# Patient Record
Sex: FEMALE | Race: WHITE | NOT HISPANIC OR LATINO | Employment: UNEMPLOYED | URBAN - METROPOLITAN AREA
[De-identification: names, ages, dates, MRNs, and addresses within clinical notes are randomized per-mention and may not be internally consistent; named-entity substitution may affect disease eponyms.]

---

## 2017-12-26 ENCOUNTER — SPECIALTY PHARMACY (OUTPATIENT)
Dept: ONCOLOGY | Facility: HOSPITAL | Age: 57
End: 2017-12-26

## 2017-12-26 ENCOUNTER — LAB (OUTPATIENT)
Dept: LAB | Facility: HOSPITAL | Age: 57
End: 2017-12-26

## 2017-12-26 ENCOUNTER — CONSULT (OUTPATIENT)
Dept: ONCOLOGY | Facility: CLINIC | Age: 57
End: 2017-12-26

## 2017-12-26 VITALS
TEMPERATURE: 97.6 F | HEART RATE: 112 BPM | SYSTOLIC BLOOD PRESSURE: 133 MMHG | RESPIRATION RATE: 16 BRPM | WEIGHT: 123 LBS | DIASTOLIC BLOOD PRESSURE: 87 MMHG | BODY MASS INDEX: 22.63 KG/M2 | HEIGHT: 62 IN

## 2017-12-26 DIAGNOSIS — C50.919 METASTATIC BREAST CANCER: Primary | ICD-10-CM

## 2017-12-26 DIAGNOSIS — C50.919 METASTATIC BREAST CANCER: ICD-10-CM

## 2017-12-26 LAB
ALBUMIN SERPL-MCNC: 4.1 G/DL (ref 3.2–4.8)
ALBUMIN/GLOB SERPL: 1.5 G/DL (ref 1.5–2.5)
ALP SERPL-CCNC: 193 U/L (ref 25–100)
ALT SERPL W P-5'-P-CCNC: 15 U/L (ref 7–40)
ANION GAP SERPL CALCULATED.3IONS-SCNC: 10 MMOL/L (ref 3–11)
AST SERPL-CCNC: 26 U/L (ref 0–33)
BILIRUB SERPL-MCNC: 0.4 MG/DL (ref 0.3–1.2)
BUN BLD-MCNC: 11 MG/DL (ref 9–23)
BUN/CREAT SERPL: 18.3 (ref 7–25)
CALCIUM SPEC-SCNC: 9.5 MG/DL (ref 8.7–10.4)
CHLORIDE SERPL-SCNC: 101 MMOL/L (ref 99–109)
CO2 SERPL-SCNC: 28 MMOL/L (ref 20–31)
CREAT BLD-MCNC: 0.6 MG/DL (ref 0.6–1.3)
ERYTHROCYTE [DISTWIDTH] IN BLOOD BY AUTOMATED COUNT: 14.8 % (ref 11.3–14.5)
GFR SERPL CREATININE-BSD FRML MDRD: 103 ML/MIN/1.73
GLOBULIN UR ELPH-MCNC: 2.8 GM/DL
GLUCOSE BLD-MCNC: 107 MG/DL (ref 70–100)
HCT VFR BLD AUTO: 33.5 % (ref 34.5–44)
HGB BLD-MCNC: 10.5 G/DL (ref 11.5–15.5)
LYMPHOCYTES # BLD AUTO: 1.3 10*3/MM3 (ref 0.6–4.8)
LYMPHOCYTES NFR BLD AUTO: 14 % (ref 24–44)
MCH RBC QN AUTO: 30.1 PG (ref 27–31)
MCHC RBC AUTO-ENTMCNC: 31.2 G/DL (ref 32–36)
MCV RBC AUTO: 96.4 FL (ref 80–99)
MONOCYTES # BLD AUTO: 0.4 10*3/MM3 (ref 0–1)
MONOCYTES NFR BLD AUTO: 4.5 % (ref 0–12)
NEUTROPHILS # BLD AUTO: 7.3 10*3/MM3 (ref 1.5–8.3)
NEUTROPHILS NFR BLD AUTO: 81.5 % (ref 41–71)
PLATELET # BLD AUTO: 599 10*3/MM3 (ref 150–450)
PMV BLD AUTO: 6.7 FL (ref 6–12)
POTASSIUM BLD-SCNC: 4.7 MMOL/L (ref 3.5–5.5)
PROT SERPL-MCNC: 6.9 G/DL (ref 5.7–8.2)
RBC # BLD AUTO: 3.47 10*6/MM3 (ref 3.89–5.14)
SODIUM BLD-SCNC: 139 MMOL/L (ref 132–146)
WBC NRBC COR # BLD: 9 10*3/MM3 (ref 3.5–10.8)

## 2017-12-26 PROCEDURE — 80053 COMPREHEN METABOLIC PANEL: CPT

## 2017-12-26 PROCEDURE — 86300 IMMUNOASSAY TUMOR CA 15-3: CPT | Performed by: INTERNAL MEDICINE

## 2017-12-26 PROCEDURE — 99205 OFFICE O/P NEW HI 60 MIN: CPT | Performed by: INTERNAL MEDICINE

## 2017-12-26 PROCEDURE — 85025 COMPLETE CBC W/AUTO DIFF WBC: CPT

## 2017-12-26 PROCEDURE — 36415 COLL VENOUS BLD VENIPUNCTURE: CPT | Performed by: INTERNAL MEDICINE

## 2017-12-26 RX ORDER — CYCLOBENZAPRINE HCL 10 MG
2 TABLET ORAL
COMMUNITY
Start: 2017-12-02

## 2017-12-26 RX ORDER — MORPHINE SULFATE 15 MG/1
1 TABLET, FILM COATED, EXTENDED RELEASE ORAL DAILY PRN
COMMUNITY
Start: 2017-12-16

## 2017-12-26 RX ORDER — ESCITALOPRAM OXALATE 5 MG/1
5 TABLET ORAL DAILY
Qty: 90 TABLET | Refills: 3 | Status: SHIPPED | OUTPATIENT
Start: 2017-12-26

## 2017-12-26 RX ORDER — OXYCODONE HYDROCHLORIDE 5 MG/1
1 TABLET ORAL 2 TIMES DAILY PRN
COMMUNITY
Start: 2017-12-16

## 2017-12-26 RX ORDER — METOPROLOL SUCCINATE 50 MG/1
1 TABLET, EXTENDED RELEASE ORAL DAILY
COMMUNITY
Start: 2017-11-21

## 2017-12-26 RX ORDER — ONDANSETRON HYDROCHLORIDE 8 MG/1
8 TABLET, FILM COATED ORAL 3 TIMES DAILY PRN
Qty: 30 TABLET | Refills: 5 | Status: SHIPPED | OUTPATIENT
Start: 2017-12-26

## 2017-12-26 RX ORDER — MELOXICAM 7.5 MG/1
1 TABLET ORAL DAILY
COMMUNITY
Start: 2017-12-17

## 2017-12-26 RX ORDER — OMEPRAZOLE 20 MG/1
1 CAPSULE, DELAYED RELEASE ORAL DAILY
COMMUNITY
Start: 2017-12-17

## 2017-12-26 NOTE — PROGRESS NOTES
CHIEF COMPLAINT:   Evaluation of patient with recently diagnosed widely metastatic (bone only) triple positive breast cancer    Problem List:  Patient Active Problem List   Diagnosis   • Metastatic breast cancer         RECORDS OBTAINED:  Records of the patients history including those obtained from Dr. Jasmyne muro. provider found were reviewed and summarized in detail.    HISTORY OF PRESENT ILLNESS:    This very pleasant 57-year-old   lady from [facility] is here due to her metastatic breast cancer.  He presented in 2014 with a 3.7 cm greatest dimension right breast cancer, ER and HI positive, HER-2 positive.  She was treated juan-adjuvantly with perjeta-based therapy.  Unfortunately she became symptomatic from a decrease in her ejection fraction.  Herceptin was discontinued.  She was treated with 2 further doses of cytotoxic therapy for a total of 4 doses of therapy per her recollection.  She doesn't ever think that she had much of a response to any of that treatment.  She ultimately underwent right mastectomy almost a year later in 2015.  That revealed ypT2 N1 A disease with 2 of 9 lymph nodes involved.  Surgical margins were negative.  She did not receive any postoperative radiotherapy.  She was placed on Arimidex.  She has remained on that until recently.    Her relevant history includes the fact that she has suffered from fibromyalgia for years.  In approximate the spring of this year she be first began to develop symptoms in her right hip pain.  This worsened over the next months.  Ultimately, imaging studies were obtained which showed widely metastatic bone disease in multiple sites including severe involvement of the right acetabulum.  Imaging including CT scans in November, approximately a month and a half ago, did not reveal any visceral or lung disease.  She has had some headaches.  She has not had imaging of her brain.    She was referred to orthopedics and approximately 3  weeks ago she underwent a reconstruction of her right acetabulum.  She is recovering and has recovered uneventfully.  She is weightbearing.  She is using walker at home.  Her Arimidex has been discontinued.  She has seen radiation medicine at [facility] for possible postoperative radiotherapy.  Her weight and appetite are been relatively stable.  She has been very nervous regarding her diagnosis.  She has also been depressed of course.  Her  has been very supportive.  The only other site of pain that she really does notices is something new is her left lower posterolateral rib area this has been present for a month or more and has not been progressive as far as current pain control, the patient takes 15 mg of extended release morphine once every morning and she takes one oxycodone 5 mg twice daily otherwise    Past Medical History:   Diagnosis Date   • Anemia    • Arthritis    • Breast cancer 10/2014    H/O chemotherapy   • Depression    • Fracture     Right Hip   • GERD (gastroesophageal reflux disease)    • Heart failure    • Heart murmur    • Hypertension    • IBS (irritable bowel syndrome)    • Kidney infection    Status post ablation for supraventricular tachycardia in 2000  Long-standing Sjrogens  syndrome  Long-standing fibromyalg    Her medications are listed on her intake sheet include cyclobenzaprine at night metoprolol ER omeprazole meloxicam and the above-mentioned oxycodone and morphine long-acting.  She was on lisinopril in the past for blood pressure control as well as a diuretic but has not been on these recently.    Drug allergies include Biaxin    Allergies   Allergen Reactions   • Biaxin [Clarithromycin]        Past Surgical History:   Procedure Laterality Date   • CARDIAC ABLATION  01/2000   • COLONOSCOPY  2017   • FOOT SURGERY Bilateral    • MASTECTOMY Right 09/2015    with lymphectomy       OB History   No data available       Social History     Social History   • Marital status:       "Spouse name: N/A   • Number of children: N/A   • Years of education: N/A     Social History Main Topics   • Smoking status: Never Smoker   • Smokeless tobacco: Never Used   • Alcohol use No   • Drug use: No   • Sexual activity: Defer     Other Topics Concern   • None     Social History Narrative   • None       Family History   Problem Relation Age of Onset   • Arthritis Mother    • Heart disease Father    • Pancreatic cancer Father    • Diabetes Father    • Breast cancer Sister    • Breast cancer Sister    • Heart disease Sister        REVIEW OF SYSTEMS:  Review of Systems   Constitutional: Negative for activity change and appetite change.        Fatigues easily   HENT: Negative for mouth sores, sinus pressure and voice change.         Has had recent headaches   Eyes: Negative for visual disturbance.   Respiratory: Negative for shortness of breath.    Cardiovascular: Negative for chest pain.        Issues of drug-related heart failure and supraventricular tachycardia mentioned above   Gastrointestinal: Negative for abdominal pain and vomiting.   Genitourinary: Negative for dysuria.   Musculoskeletal: Negative for arthralgias and myalgias.        Has chronic fibromyalgia.   Skin: Negative for color change.        Chronic Sjrogens  syndrome mentioned above   Neurological: Negative for dizziness, syncope and headaches.   Hematological: Negative for adenopathy.   Psychiatric/Behavioral: Negative for confusion, sleep disturbance and suicidal ideas. The patient is not nervous/anxious.        .  /87 Comment: LUE  Pulse 112  Temp 97.6 °F (36.4 °C) (Temporal Artery )   Resp 16  Ht 157.5 cm (62\")  Wt 55.8 kg (123 lb)  BMI 22.5 kg/m2    Physical  Exam:  Physical Exam   Constitutional: She is oriented to person, place, and time. She appears well-developed and well-nourished. No distress.   Well-developed well-nourished lady in no acute distress.  She appears mildly to moderately anxious at times, appropriately so "   HENT:   Head: Normocephalic.   Mouth/Throat: Oropharynx is clear and moist.   Eyes: Conjunctivae and EOM are normal. No scleral icterus.   Neck: Normal range of motion. Neck supple. No thyromegaly present.   Cardiovascular: Normal rate, regular rhythm and normal heart sounds.    No murmur heard.  Pulmonary/Chest: Effort normal and breath sounds normal. She has no wheezes. She has no rales.   Right mastectomy site noted.  No evidence of local recurrence.  Left breast without worrisome mass   Abdominal: Soft. Bowel sounds are normal. She exhibits no distension and no mass. There is no tenderness.   Musculoskeletal: She exhibits no edema or tenderness.   Lymphadenopathy:     She has no cervical adenopathy.   Neurological: She is alert and oriented to person, place, and time. She displays normal reflexes. No cranial nerve deficit.   Skin: Skin is warm and dry. No rash noted.   Psychiatric: She has a normal mood and affect. Judgment normal.   Vitals reviewed.        Performance Status: 1    Assessment/Plan     Recurrent metastatic breast cancer.  Ordinarily I would recommend Herceptin based therapy or Kadcyla or something similar.  With her history of Herceptin-related heart failure, that's not a good option.  Other options include Ibrance given with either Faslodex or letrozole.  Another option would be to begin cytotoxic therapy and I favor Xeloda.  I would also recommend that she be treated with Xgeva.    Pain Control-Currently Adequate.  I Will Get a Copy of Her Bone Scan to See If an Area Correlates with the Left Posterolateral Chest Discomfort That She Has.    Possible Need for Radiotherapy Following Right Hip Surgery.  I Don't Know If This Is Actually Needed since She Will Be Treated Systemically.  I Will Give This Further Thought.    Reactive Depression and Some Anxiety.  I Have Recommended and Written for Some Lexapro 10 Mg Once Daily    History of Herceptin-related cardiomyopathy.  I will talk with Dr. Wakefield  jolanta her cardiologist at the [facility].    All the above discussed.  I spent well more than an hour with the patient and her      Diagnostically I will also obtain an MRI of her brain given her headaches.  Abhijeet Pond MD    12/26/2017

## 2017-12-27 LAB — CANCER AG27-29 SERPL-ACNC: 23.6 U/ML (ref 0–38.6)

## 2017-12-29 ENCOUNTER — HOSPITAL ENCOUNTER (OUTPATIENT)
Dept: MRI IMAGING | Facility: HOSPITAL | Age: 57
Discharge: HOME | End: 2017-12-29
Attending: INTERNAL MEDICINE | Admitting: INTERNAL MEDICINE

## 2017-12-29 PROCEDURE — 70553 MRI BRAIN STEM W/O & W/DYE: CPT

## 2017-12-29 PROCEDURE — 0 GADOBENATE DIMEGLUMINE 529 MG/ML SOLUTION: Performed by: INTERNAL MEDICINE

## 2017-12-29 PROCEDURE — A9577 INJ MULTIHANCE: HCPCS | Performed by: INTERNAL MEDICINE

## 2017-12-29 RX ADMIN — GADOBENATE DIMEGLUMINE 10 ML: 529 INJECTION, SOLUTION INTRAVENOUS at 10:30

## 2018-01-02 ENCOUNTER — SPECIALTY PHARMACY (OUTPATIENT)
Dept: ONCOLOGY | Facility: HOSPITAL | Age: 58
End: 2018-01-02

## 2018-01-02 NOTE — PROGRESS NOTES
Oral Chemotherapy Teaching      Patient Name/:  Rashmi Barkley   1960  Oral Chemotherapy Regimen:  Ibrance 125mg PO daily x 21 days, followed by 7 days off + Faslodex IM injection every 2 weeks x 3 doses, then monthly thereafter  Date Started Medication: Scheduled for first Faslodex injection on 18    Initial Teaching Follow Up Comments     Safety     Storage instructions (away from children; away from heat/cold, sunlight, or moisture), handling - use of gloves (caregivers), washing hands after touching pills, managing waste     “How are you storing your medications?”, reminders on storage, proper handling (caregivers using gloves, washing hands, away from children, managing waste, etc.), disposal of medication with D/C or dosage change    Spoke with patient over the phone regarding handling and storage precautions associated with hazardous medication. Ibrance to be stored at room temp, away from pets and children. Wash hands after handling. Patient provided with my contact information and spouse to call with additional questions.      Adherence      patient and/or caregiver on how to take medication, take with/without food, assess their adherence potential, stress importance of adherence, ways to manage adherence (pill boxes, phone reminders, calendars), what to do if miss a dose   “How are you taking your medication?” “How are you remembering to take your medication?”, “How many doses have you missed?”, determine reasons for non-adherence (not remembering, side effects, etc), ways to improve, overadherence? Remind patient of ways to improve/maintain adherence   Discussed plan to take Ibrance 125mg PO daily x 21 days, followed by 7 day off. Ibrance to be administered within 30 minutes of meal, suggested administration with breakfast. Patient is scheduled for first faslodex injection on 18. Discussed administration of faslodex as IM injection, will receive dose biweekly x 3 doses then monthly  thereafter. Discussed that Ibrance would be mailed from [facility] and they should be reaching out today to schedule delivery of Ibrance. Pt verbalized understanding. Mailing out written paperwork regarding medication as well as calendar with start date of 1/9/2018.      Side Effects/Adverse Reactions      patient on potential side effects, s/s, ways to manage, when to call MD/seek help     Determine if patient experiencing side effects, ways to manage  Pt is concerned regarding nausea, as she reports having issues with prior chemotherapy in the past. Discussed the incidence of nausea with Ibrance ~ 20-30%, discussed use of zofran PRN nausea. Pt plans to take zofran 30 minutes prior to first dose of Ibrance. Discussed common side effects include fatigue, neutropenia, injection site pain, myaglias, arthralgias, and menopausal like symptoms.      Miscellaneous     Food interactions, DDIs, financial issues Determine if patient started any new medications since being placed on oral chemo (analyze for DDI) No DDIs identified with active medication list. Mailing out patient calendar. Plan to touch base with patient on 1/9/18 in infusion to answer remaining questions or concerns. Pt aware to call with questions. Provided my name and contact information.      Additional Notes: Discussed aforementioned material with patient over the phone. Mailing out calendar and written packet information. Plan to follow-up with patient on 1/9/18 in infusion to answer remaining questions.

## 2018-01-09 ENCOUNTER — APPOINTMENT (OUTPATIENT)
Dept: ONCOLOGY | Facility: HOSPITAL | Age: 58
End: 2018-01-09

## 2018-01-23 ENCOUNTER — TELEPHONE (OUTPATIENT)
Dept: ONCOLOGY | Facility: CLINIC | Age: 58
End: 2018-01-23

## 2018-01-23 NOTE — TELEPHONE ENCOUNTER
Called patient to follow up on status of insurance.  Patient states she is going to . She had her first treatment yesterday.  I instructed patient to call if she needed us.  Patient stated understanding.